# Patient Record
Sex: FEMALE | Race: WHITE | HISPANIC OR LATINO | Employment: PART TIME | URBAN - METROPOLITAN AREA
[De-identification: names, ages, dates, MRNs, and addresses within clinical notes are randomized per-mention and may not be internally consistent; named-entity substitution may affect disease eponyms.]

---

## 2023-09-21 ENCOUNTER — OFFICE VISIT (OUTPATIENT)
Dept: URGENT CARE | Facility: CLINIC | Age: 48
End: 2023-09-21
Payer: COMMERCIAL

## 2023-09-21 ENCOUNTER — APPOINTMENT (OUTPATIENT)
Dept: RADIOLOGY | Facility: CLINIC | Age: 48
End: 2023-09-21
Payer: COMMERCIAL

## 2023-09-21 VITALS
WEIGHT: 171.38 LBS | HEART RATE: 78 BPM | TEMPERATURE: 98 F | SYSTOLIC BLOOD PRESSURE: 128 MMHG | DIASTOLIC BLOOD PRESSURE: 78 MMHG | RESPIRATION RATE: 14 BRPM | OXYGEN SATURATION: 97 %

## 2023-09-21 DIAGNOSIS — S20.212A RIB CONTUSION, LEFT, INITIAL ENCOUNTER: ICD-10-CM

## 2023-09-21 DIAGNOSIS — S80.02XA CONTUSION OF LEFT KNEE, INITIAL ENCOUNTER: ICD-10-CM
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
## 2023-09-21 PROCEDURE — 99283 EMERGENCY DEPT VISIT LOW MDM: CPT | Performed by: PHYSICIAN ASSISTANT

## 2023-09-21 PROCEDURE — G0382 LEV 3 HOSP TYPE B ED VISIT: HCPCS | Performed by: PHYSICIAN ASSISTANT

## 2023-09-21 PROCEDURE — 73564 X-RAY EXAM KNEE 4 OR MORE: CPT

## 2023-09-21 PROCEDURE — 71101 X-RAY EXAM UNILAT RIBS/CHEST: CPT

## 2023-09-21 RX ORDER — IBUPROFEN 800 MG/1
800 TABLET ORAL EVERY 6 HOURS PRN
Qty: 30 TABLET | Refills: 0 | Status: SHIPPED | OUTPATIENT
Start: 2023-09-21

## 2023-09-21 RX ORDER — METHOCARBAMOL 500 MG/1
500 TABLET, FILM COATED ORAL 4 TIMES DAILY
Qty: 28 TABLET | Refills: 0 | Status: SHIPPED | OUTPATIENT
Start: 2023-09-21

## 2023-09-21 NOTE — LETTER
September 21, 2023     Patient: Brandon Fletcher   YOB: 1975   Date of Visit: 9/21/2023       To Whom it May Concern:    Brandon Fletcher was seen in my clinic on 9/21/2023. She is excused from work 09/21/2023 and may return 09/22/2023. If you have any questions or concerns, please don't hesitate to call.          Sincerely,          Jackie Santana PA-C        CC: No Recipients
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
## 2023-12-12 ENCOUNTER — OFFICE VISIT (OUTPATIENT)
Dept: URGENT CARE | Facility: CLINIC | Age: 48
End: 2023-12-12
Payer: COMMERCIAL

## 2023-12-12 VITALS
HEART RATE: 70 BPM | DIASTOLIC BLOOD PRESSURE: 80 MMHG | SYSTOLIC BLOOD PRESSURE: 118 MMHG | RESPIRATION RATE: 18 BRPM | OXYGEN SATURATION: 98 % | TEMPERATURE: 97.7 F

## 2023-12-12 DIAGNOSIS — J06.9 ACUTE URI: Primary | ICD-10-CM

## 2023-12-12 PROCEDURE — 87636 SARSCOV2 & INF A&B AMP PRB: CPT | Performed by: PHYSICIAN ASSISTANT

## 2023-12-12 PROCEDURE — G0383 LEV 4 HOSP TYPE B ED VISIT: HCPCS | Performed by: PHYSICIAN ASSISTANT

## 2023-12-12 PROCEDURE — S9083 URGENT CARE CENTER GLOBAL: HCPCS | Performed by: PHYSICIAN ASSISTANT

## 2023-12-12 RX ORDER — DEXTROMETHORPHAN HYDROBROMIDE AND PROMETHAZINE HYDROCHLORIDE 15; 6.25 MG/5ML; MG/5ML
5 SYRUP ORAL 4 TIMES DAILY PRN
Qty: 120 ML | Refills: 0 | Status: SHIPPED | OUTPATIENT
Start: 2023-12-12

## 2023-12-12 NOTE — PROGRESS NOTES
[facility]        NAME: Armida Rodriguez is a 53 y.o. female  : 1975    MRN: [id number]  DATE: 2023  TIME: 12:19 PM      Assessment and Plan     Acute URI [J06.9]  1. Acute URI  Covid/Flu-Office Collect    promethazine-dextromethorphan (PHENERGAN-DM) 6.25-15 mg/5 mL oral syrup        Note:   Covid/flu send out--patient just out of window for Tamiflu anyway, but wants to know because she is a . Gave cough med Rx as well. Patient Instructions   There are no Patient Instructions on file for this visit. Follow up with PCP in 3-5 days. Go to ER if symptoms worsen. Chief Complaint     Chief Complaint   Patient presents with    Cold Like Symptoms     Pt c/o cough with head and chest congestion, body aches that started on  am. Pt states at home covid test negative. History of Present Illness     Patient presents with cough and body aches x 2 days. She states she has been taking mucinex and aleve without relief. She took a rapid covid test at home yesterday which was negative. Review of Systems     Review of Systems   Constitutional:  Negative for chills, fatigue and fever. HENT:  Negative for congestion, ear pain, postnasal drip, rhinorrhea, sinus pressure, sinus pain and sore throat. Eyes:  Negative for pain and visual disturbance. Respiratory:  Positive for cough. Negative for chest tightness and shortness of breath. Cardiovascular:  Negative for chest pain and palpitations. Gastrointestinal:  Negative for abdominal pain, diarrhea, nausea and vomiting. Genitourinary:  Negative for dysuria and hematuria. Musculoskeletal:  Positive for myalgias. Negative for arthralgias and back pain. Skin:  Negative for rash. Neurological:  Negative for dizziness, seizures, syncope, numbness and headaches. All other systems reviewed and are negative.         Current Medications       Current Outpatient Medications: promethazine-dextromethorphan (PHENERGAN-DM) 6.25-15 mg/5 mL oral syrup, Take 5 mL by mouth 4 (four) times a day as needed for cough, Disp: 120 mL, Rfl: 0    ibuprofen (MOTRIN) 800 mg tablet, Take 1 tablet (800 mg total) by mouth every 6 (six) hours as needed for mild pain, Disp: 30 tablet, Rfl: 0    methocarbamol (ROBAXIN) 500 mg tablet, Take 1 tablet (500 mg total) by mouth 4 (four) times a day, Disp: 28 tablet, Rfl: 0    Current Allergies     Allergies as of 12/12/2023    (No Known Allergies)              The following portions of the patient's history were reviewed and updated as appropriate: allergies, current medications, past family history, past medical history, past social history, past surgical history, and problem list.     History reviewed. No pertinent past medical history. History reviewed. No pertinent surgical history. History reviewed. No pertinent family history. Medications have been verified. Objective     /80   Pulse 70   Temp 97.7 °F (36.5 °C) (Temporal)   Resp 18   SpO2 98%   No LMP recorded. Physical Exam     Physical Exam  Vitals and nursing note reviewed. Constitutional:       Appearance: Normal appearance. She is normal weight. HENT:      Head: Normocephalic and atraumatic. Nose: Nose normal.      Mouth/Throat:      Mouth: Mucous membranes are moist.      Pharynx: Oropharynx is clear. Cardiovascular:      Rate and Rhythm: Normal rate and regular rhythm. Heart sounds: Normal heart sounds. Pulmonary:      Effort: Pulmonary effort is normal.      Breath sounds: Normal breath sounds. Skin:     General: Skin is warm and dry. Neurological:      General: No focal deficit present. Mental Status: She is alert and oriented to person, place, and time.    Psychiatric:         Mood and Affect: Mood normal.         Behavior: Behavior normal.

## 2023-12-12 NOTE — LETTER
December 12, 2023     Patient: Luis A Weston   YOB: 1975   Date of Visit: 12/12/2023       To Whom it May Concern:    Luis A Weston was seen in my clinic on 12/12/2023. She may return to work on 12/13/2023 . If you have any questions or concerns, please don't hesitate to call.          Sincerely,          Arnaldo Mensah PA-C        CC: No Recipients

## 2023-12-13 LAB
FLUAV RNA RESP QL NAA+PROBE: NEGATIVE
FLUBV RNA RESP QL NAA+PROBE: NEGATIVE
SARS-COV-2 RNA RESP QL NAA+PROBE: NEGATIVE

## 2024-01-10 ENCOUNTER — APPOINTMENT (EMERGENCY)
Dept: RADIOLOGY | Facility: HOSPITAL | Age: 49
End: 2024-01-10
Payer: COMMERCIAL

## 2024-01-10 ENCOUNTER — HOSPITAL ENCOUNTER (EMERGENCY)
Facility: HOSPITAL | Age: 49
Discharge: HOME | End: 2024-01-10
Attending: EMERGENCY MEDICINE
Payer: COMMERCIAL

## 2024-01-10 VITALS
OXYGEN SATURATION: 100 % | SYSTOLIC BLOOD PRESSURE: 130 MMHG | RESPIRATION RATE: 18 BRPM | TEMPERATURE: 97.6 F | HEART RATE: 97 BPM | DIASTOLIC BLOOD PRESSURE: 63 MMHG

## 2024-01-10 DIAGNOSIS — S89.92XA LEFT KNEE INJURY, INITIAL ENCOUNTER: Primary | ICD-10-CM

## 2024-01-10 DIAGNOSIS — M25.562 ACUTE PAIN OF LEFT KNEE: ICD-10-CM

## 2024-01-10 PROCEDURE — 96372 THER/PROPH/DIAG INJ SC/IM: CPT

## 2024-01-10 PROCEDURE — 99284 EMERGENCY DEPT VISIT MOD MDM: CPT

## 2024-01-10 PROCEDURE — 73564 X-RAY EXAM KNEE 4 OR MORE: CPT

## 2024-01-10 PROCEDURE — 99284 EMERGENCY DEPT VISIT MOD MDM: CPT | Performed by: EMERGENCY MEDICINE

## 2024-01-10 RX ORDER — KETOROLAC TROMETHAMINE 30 MG/ML
30 INJECTION, SOLUTION INTRAMUSCULAR; INTRAVENOUS ONCE
Status: COMPLETED | OUTPATIENT
Start: 2024-01-10 | End: 2024-01-10

## 2024-01-10 RX ORDER — ACETAMINOPHEN 325 MG/1
975 TABLET ORAL ONCE
Status: COMPLETED | OUTPATIENT
Start: 2024-01-10 | End: 2024-01-10

## 2024-01-10 RX ORDER — METHOCARBAMOL 500 MG/1
1000 TABLET, FILM COATED ORAL 3 TIMES DAILY PRN
Qty: 30 TABLET | Refills: 0 | Status: SHIPPED | OUTPATIENT
Start: 2024-01-10

## 2024-01-10 RX ADMIN — KETOROLAC TROMETHAMINE 30 MG: 30 INJECTION, SOLUTION INTRAMUSCULAR at 18:10

## 2024-01-10 RX ADMIN — ACETAMINOPHEN 975 MG: 325 TABLET, FILM COATED ORAL at 18:10

## 2024-01-10 NOTE — Clinical Note
Aliza Parker was seen and treated in our emergency department on 1/10/2024.                Diagnosis: knee pain, difficulty walking    Aliza  may return to work on return date.    She may return on this date: 01/15/2024         If you have any questions or concerns, please don't hesitate to call.      Maximino Kessler MD    ______________________________           _______________          _______________  Hospital Representative                              Date                                Time

## 2024-01-10 NOTE — DISCHARGE INSTRUCTIONS
Please follow up PCP.  If taking the muscle relaxant Robaxin, please do not drive or operate heavy machinery or perform other dangerous tasks.  Recommend tylenol 650 mg and ibuprofen 600 mg every 6 hours as needed for pain. Please return for severe chest pain, significant shortness of breath, severely worsening symptoms, or any other concerning signs or symptoms. Please refer to the following documents for additional instructions and return precautions.

## 2024-01-10 NOTE — ED PROVIDER NOTES
History  Chief Complaint   Patient presents with    Fall     L knee pain after a fall yesterday      48-year-old female no significant past history presenting with left knee pain.  Patient reports mechanical slip and fall on ice yesterday landing on her left knee.  Patient reports able to weight-bear by pain with weightbearing.  Denies any break in skin.  Denies any neurological changes such as motor or sensory deficits.  Denies any other pain or injury.  Denies any other complaints.  Chart reviewed.    No past medical history on file.  Family History: non-contributory  Social History          Prior to Admission Medications   Prescriptions Last Dose Informant Patient Reported? Taking?   ibuprofen (MOTRIN) 800 mg tablet   No No   Sig: Take 1 tablet (800 mg total) by mouth every 6 (six) hours as needed for mild pain   methocarbamol (ROBAXIN) 500 mg tablet   No No   Sig: Take 1 tablet (500 mg total) by mouth 4 (four) times a day   promethazine-dextromethorphan (PHENERGAN-DM) 6.25-15 mg/5 mL oral syrup   No No   Sig: Take 5 mL by mouth 4 (four) times a day as needed for cough      Facility-Administered Medications: None       No past medical history on file.    No past surgical history on file.    No family history on file.  I have reviewed and agree with the history as documented.    E-Cigarette/Vaping    E-Cigarette Use Never User      E-Cigarette/Vaping Substances     Social History     Tobacco Use    Smoking status: Every Day     Types: Cigarettes     Passive exposure: Current    Smokeless tobacco: Never   Vaping Use    Vaping status: Never Used   Substance Use Topics    Alcohol use: Not Currently    Drug use: Not Currently       Review of Systems   Constitutional:  Negative for appetite change, chills, diaphoresis, fever and unexpected weight change.   HENT:  Negative for congestion and rhinorrhea.    Eyes:  Negative for photophobia and visual disturbance.   Respiratory:  Negative for cough, chest tightness and  shortness of breath.    Cardiovascular:  Negative for chest pain, palpitations and leg swelling.   Gastrointestinal:  Negative for abdominal distention, abdominal pain, blood in stool, constipation, diarrhea, nausea and vomiting.   Genitourinary:  Negative for dysuria and hematuria.   Musculoskeletal:  Positive for arthralgias. Negative for back pain, joint swelling, neck pain and neck stiffness.   Skin:  Negative for color change, pallor, rash and wound.   Neurological:  Negative for dizziness, syncope, weakness, light-headedness and headaches.   Psychiatric/Behavioral:  Negative for agitation.    All other systems reviewed and are negative.      Physical Exam  Physical Exam  Vitals and nursing note reviewed.   Constitutional:       General: She is not in acute distress.     Appearance: Normal appearance. She is well-developed. She is not ill-appearing, toxic-appearing or diaphoretic.   HENT:      Head: Normocephalic and atraumatic.      Nose: Nose normal. No congestion or rhinorrhea.      Mouth/Throat:      Mouth: Mucous membranes are moist.      Pharynx: Oropharynx is clear. No oropharyngeal exudate or posterior oropharyngeal erythema.   Eyes:      General: No scleral icterus.        Right eye: No discharge.         Left eye: No discharge.      Extraocular Movements: Extraocular movements intact.      Conjunctiva/sclera: Conjunctivae normal.      Pupils: Pupils are equal, round, and reactive to light.   Neck:      Vascular: No JVD.      Trachea: No tracheal deviation.      Comments: Supple. Normal range of motion.   Cardiovascular:      Rate and Rhythm: Normal rate and regular rhythm.      Heart sounds: Normal heart sounds. No murmur heard.     No friction rub. No gallop.      Comments: Normal rate and regular rhythm  Pulmonary:      Effort: Pulmonary effort is normal. No respiratory distress.      Breath sounds: Normal breath sounds. No stridor. No wheezing or rales.      Comments: Clear to auscultation  bilaterally  Chest:      Chest wall: No tenderness.   Abdominal:      General: Bowel sounds are normal. There is no distension.      Palpations: Abdomen is soft.      Tenderness: There is no abdominal tenderness. There is no right CVA tenderness, left CVA tenderness, guarding or rebound.      Comments: Soft, nontender, nondistended.  Normal bowel sounds throughout   Musculoskeletal:         General: Tenderness present. No swelling, deformity or signs of injury. Normal range of motion.      Cervical back: Normal range of motion and neck supple. No rigidity. No muscular tenderness.      Right lower leg: No edema.      Left lower leg: No edema.      Comments: Tenderness medial aspect of the left knee.  Full range of motion intact.  2+ DP PT pulses   Lymphadenopathy:      Cervical: No cervical adenopathy.   Skin:     General: Skin is warm and dry.      Coloration: Skin is not pale.      Findings: No erythema or rash.   Neurological:      General: No focal deficit present.      Mental Status: She is alert. Mental status is at baseline.      Sensory: No sensory deficit.      Motor: No weakness or abnormal muscle tone.      Coordination: Coordination normal.      Comments: Alert.  Strength and sensation grossly intact.  Some difficulty with ambulation secondary to left knee pain   Psychiatric:         Behavior: Behavior normal.         Thought Content: Thought content normal.         Vital Signs  ED Triage Vitals   Temperature Pulse Respirations Blood Pressure SpO2   01/10/24 1702 01/10/24 1702 01/10/24 1702 01/10/24 1702 01/10/24 1702   97.6 °F (36.4 °C) 97 18 130/63 100 %      Temp src Heart Rate Source Patient Position - Orthostatic VS BP Location FiO2 (%)   -- -- -- -- --             Pain Score       01/10/24 1810       8           Vitals:    01/10/24 1702   BP: 130/63   Pulse: 97         Visual Acuity  Visual Acuity      Flowsheet Row Most Recent Value   L Pupil Size (mm) 3   R Pupil Size (mm) 3            ED  Medications  Medications   acetaminophen (TYLENOL) tablet 975 mg (975 mg Oral Given 1/10/24 1810)   ketorolac (TORADOL) injection 30 mg (30 mg Intramuscular Given 1/10/24 1810)       Diagnostic Studies  Results Reviewed       None                   XR knee 4+ vw left injury    (Results Pending)              Procedures  Procedures         ED Course                               SBIRT 20yo+      Flowsheet Row Most Recent Value   Initial Alcohol Screen: US AUDIT-C     1. How often do you have a drink containing alcohol? 0 Filed at: 01/10/2024 1848   2. How many drinks containing alcohol do you have on a typical day you are drinking?  0 Filed at: 01/10/2024 1848   3b. FEMALE Any Age, or MALE 65+: How often do you have 4 or more drinks on one occassion? 0 Filed at: 01/10/2024 1848   Audit-C Score 0 Filed at: 01/10/2024 1848   OPHELIA: How many times in the past year have you...    Used an illegal drug or used a prescription medication for non-medical reasons? Never Filed at: 01/10/2024 1848                      Medical Decision Making  48-year-old female no significant past history presenting with left knee pain.  Mechanical slip and fall landing on left knee with pain and tenderness.  Full range of motion intact.  Neurovascular intact.  Plan for x-rays.  Symptom management with oral and IM medications.  Reassess.    X-rays interpreted by me without significant acute process.  Symptoms improving with medications.  Able to ambulate using crutches.  Work note. Discussed results and recommendations. Advised follow up PCP and ortho. Medication recommendations. Given instructions and return precautions. Patient/family at bedside acknowledged understanding of all written and verbal instructions and return precautions. Discharged.     Amount and/or Complexity of Data Reviewed  Radiology: ordered.    Risk  OTC drugs.  Prescription drug management.             Disposition  Final diagnoses:   Left knee injury, initial encounter    Acute pain of left knee     Time reflects when diagnosis was documented in both MDM as applicable and the Disposition within this note       Time User Action Codes Description Comment    1/10/2024  5:52 PM Maximino Kessler Add [S89.92XA] Left knee injury, initial encounter     1/10/2024  5:52 PM Maximino Kessler Add [M25.562] Acute pain of left knee           ED Disposition       ED Disposition   Discharge    Condition   Stable    Date/Time   Wed Calvin 10, 2024 1752    Comment   Aliza Parker discharge to home/self care.                   Follow-up Information       Follow up With Specialties Details Why Contact Info Additional Information    Infolink  Call  for [phone]       [facility] Schedule an appointment as soon as possible for a visit in 1 week  [address]  [facility] [address] [phone] [facility] [address], [place], [place], [address] [phone]            Discharge Medication List as of 1/10/2024  5:53 PM        START taking these medications    Details   !! methocarbamol (ROBAXIN) 500 mg tablet Take 2 tablets (1,000 mg total) by mouth 3 (three) times a day as needed for muscle spasms (knee pain), Starting Wed 1/10/2024, Normal       !! - Potential duplicate medications found. Please discuss with provider.        CONTINUE these medications which have NOT CHANGED    Details   ibuprofen (MOTRIN) 800 mg tablet Take 1 tablet (800 mg total) by mouth every 6 (six) hours as needed for mild pain, Starting Thu 9/21/2023, Normal      !! methocarbamol (ROBAXIN) 500 mg tablet Take 1 tablet (500 mg total) by mouth 4 (four) times a day, Starting Thu 9/21/2023, Normal      promethazine-dextromethorphan (PHENERGAN-DM) 6.25-15 mg/5 mL oral syrup Take 5 mL by mouth 4 (four) times a day as needed for cough, Starting Tue 12/12/2023, Normal       !! - Potential  duplicate medications found. Please discuss with provider.          No discharge procedures on file.    PDMP Review       None            ED Provider  Electronically Signed by             Maximino Kessler MD  01/11/24 0002

## 2024-12-28 ENCOUNTER — HOSPITAL ENCOUNTER (EMERGENCY)
Facility: HOSPITAL | Age: 49
Discharge: HOME | End: 2024-12-28
Attending: EMERGENCY MEDICINE
Payer: COMMERCIAL

## 2024-12-28 VITALS
OXYGEN SATURATION: 98 % | DIASTOLIC BLOOD PRESSURE: 72 MMHG | SYSTOLIC BLOOD PRESSURE: 134 MMHG | TEMPERATURE: 98.4 F | HEART RATE: 103 BPM | RESPIRATION RATE: 20 BRPM

## 2024-12-28 DIAGNOSIS — R05.9 COUGH: Primary | ICD-10-CM

## 2024-12-28 LAB
FLUAV AG UPPER RESP QL IA.RAPID: NEGATIVE
FLUBV AG UPPER RESP QL IA.RAPID: NEGATIVE
SARS-COV+SARS-COV-2 AG RESP QL IA.RAPID: NEGATIVE

## 2024-12-28 PROCEDURE — 99283 EMERGENCY DEPT VISIT LOW MDM: CPT

## 2024-12-28 PROCEDURE — 99284 EMERGENCY DEPT VISIT MOD MDM: CPT | Performed by: EMERGENCY MEDICINE

## 2024-12-28 PROCEDURE — 87811 SARS-COV-2 COVID19 W/OPTIC: CPT | Performed by: EMERGENCY MEDICINE

## 2024-12-28 PROCEDURE — 87804 INFLUENZA ASSAY W/OPTIC: CPT | Performed by: EMERGENCY MEDICINE

## 2024-12-28 RX ORDER — CETIRIZINE HYDROCHLORIDE 10 MG/1
10 TABLET ORAL DAILY
Qty: 30 TABLET | Refills: 0 | Status: SHIPPED | OUTPATIENT
Start: 2024-12-28

## 2024-12-28 RX ORDER — CETIRIZINE HYDROCHLORIDE 10 MG/1
10 TABLET ORAL DAILY
Qty: 30 TABLET | Refills: 0 | Status: SHIPPED | OUTPATIENT
Start: 2024-12-28 | End: 2024-12-28

## 2025-01-06 NOTE — ED PROVIDER NOTES
Time reflects when diagnosis was documented in both MDM as applicable and the Disposition within this note       Time User Action Codes Description Comment    12/28/2024  4:16 PM Jordan Michael Add [R05.9] Cough           ED Disposition       ED Disposition   Discharge    Condition   Stable    Date/Time   Sat Dec 28, 2024  4:16 PM    Comment   Aliza Parker discharge to home/self care.                   Assessment & Plan       Medical Decision Making  49-year-old female with viral syndrome, recommend supportive care, Tylenol Motrin as needed, antihistamine for congestion, reviewed return precautions.    Amount and/or Complexity of Data Reviewed  Labs: ordered.    Risk  OTC drugs.             Medications - No data to display    ED Risk Strat Scores                          SBIRT 22yo+      Flowsheet Row Most Recent Value   Initial Alcohol Screen: US AUDIT-C     1. How often do you have a drink containing alcohol? 0 Filed at: 12/28/2024 1514   2. How many drinks containing alcohol do you have on a typical day you are drinking?  0 Filed at: 12/28/2024 1514   3a. Male UNDER 65: How often do you have five or more drinks on one occasion? 0 Filed at: 12/28/2024 1514   3b. FEMALE Any Age, or MALE 65+: How often do you have 4 or more drinks on one occassion? 0 Filed at: 12/28/2024 1514   Audit-C Score 0 Filed at: 12/28/2024 1514   OPHELIA: How many times in the past year have you...    Used an illegal drug or used a prescription medication for non-medical reasons? Never Filed at: 12/28/2024 1514                            History of Present Illness       Chief Complaint   Patient presents with    Cough     Cough, runny nose ear ache x 1.5 months, family here and sick w same        No past medical history on file.   No past surgical history on file.   No family history on file.   Social History     Tobacco Use    Smoking status: Every Day     Types: Cigarettes     Passive exposure: Current    Smokeless tobacco: Never   Vaping Use     Vaping status: Never Used   Substance Use Topics    Alcohol use: Not Currently    Drug use: Not Currently      E-Cigarette/Vaping    E-Cigarette Use Never User       E-Cigarette/Vaping Substances      I have reviewed and agree with the history as documented.     49-year-old female presenting to the emergency department for evaluation of cough.  Patient had cough congestion runny nose for several days, adult daughter is here with same.  She has no shortness of breath she has no fevers or chills.        Review of Systems   Constitutional:  Negative for appetite change, chills, fatigue and fever.   HENT:  Positive for congestion and sore throat. Negative for sneezing.    Eyes:  Negative for visual disturbance.   Respiratory:  Positive for cough. Negative for choking, chest tightness, shortness of breath and wheezing.    Cardiovascular:  Negative for chest pain and palpitations.   Gastrointestinal:  Negative for abdominal pain, constipation, diarrhea, nausea and vomiting.   Genitourinary:  Negative for difficulty urinating and dysuria.   Neurological:  Negative for dizziness, weakness, light-headedness, numbness and headaches.   All other systems reviewed and are negative.          Objective       ED Triage Vitals [12/28/24 1513]   Temperature Pulse Blood Pressure Respirations SpO2 Patient Position - Orthostatic VS   98.4 °F (36.9 °C) 103 134/72 20 98 % Sitting      Temp Source Heart Rate Source BP Location FiO2 (%) Pain Score    Oral Monitor Left arm -- --      Vitals      Date and Time Temp Pulse SpO2 Resp BP Pain Score FACES Pain Rating User   12/28/24 1513 98.4 °F (36.9 °C) 103 98 % 20 134/72 -- -- AS            Physical Exam  Constitutional:       General: She is not in acute distress.     Appearance: She is well-developed. She is not diaphoretic.   HENT:      Head: Normocephalic and atraumatic.   Eyes:      Pupils: Pupils are equal, round, and reactive to light.   Neck:      Vascular: No JVD.      Trachea: No  tracheal deviation.   Cardiovascular:      Rate and Rhythm: Normal rate and regular rhythm.      Heart sounds: Normal heart sounds. No murmur heard.     No friction rub. No gallop.   Pulmonary:      Effort: Pulmonary effort is normal. No respiratory distress.      Breath sounds: Normal breath sounds. No wheezing or rales.   Abdominal:      General: Bowel sounds are normal. There is no distension.      Palpations: Abdomen is soft.      Tenderness: There is no abdominal tenderness. There is no guarding or rebound.   Skin:     General: Skin is warm and dry.      Coloration: Skin is not pale.   Neurological:      Mental Status: She is alert and oriented to person, place, and time.      Cranial Nerves: No cranial nerve deficit.      Motor: No abnormal muscle tone.   Psychiatric:         Behavior: Behavior normal.         Results Reviewed       Procedure Component Value Units Date/Time    FLU/COVID Rapid Antigen (30 min. TAT) - Preferred screening test in ED [[id number]]  (Normal) Collected: 12/28/24 1515    Lab Status: Final result Specimen: Nares from Nose Updated: 12/28/24 1540     SARS COV Rapid Antigen Negative     Influenza A Rapid Antigen Negative     Influenza B Rapid Antigen Negative    Narrative:      This test has been performed using the [employer] 2 FLU+SARS Antigen test under the Emergency Use Authorization (EUA). This test has been validated by the  and verified by the performing laboratory. The [employer] uses lateral flow immunofluorescent sandwich assay to detect SARS-COV, Influenza A and Influenza B Antigen.     The [employer] 2 SARS Antigen test does not differentiate between SARS-CoV and SARS-CoV-2.     Negative results are presumptive and may be confirmed with a molecular assay, if necessary, for patient management. Negative results do not rule out SARS-CoV-2 or influenza infection and should not be used as the sole basis for treatment or patient management decisions. A negative test result  may occur if the level of antigen in a sample is below the limit of detection of this test.     Positive results are indicative of the presence of viral antigens, but do not rule out bacterial infection or co-infection with other viruses.     All test results should be used as an adjunct to clinical observations and other information available to the provider.    FOR PEDIATRIC PATIENTS - copy/paste COVID Guidelines URL to browser: [email or web address]            No orders to display       Procedures    ED Medication and Procedure Management   Prior to Admission Medications   Prescriptions Last Dose Informant Patient Reported? Taking?   ibuprofen (MOTRIN) 800 mg tablet   No No   Sig: Take 1 tablet (800 mg total) by mouth every 6 (six) hours as needed for mild pain   methocarbamol (ROBAXIN) 500 mg tablet   No No   Sig: Take 1 tablet (500 mg total) by mouth 4 (four) times a day   methocarbamol (ROBAXIN) 500 mg tablet   No No   Sig: Take 2 tablets (1,000 mg total) by mouth 3 (three) times a day as needed for muscle spasms (knee pain)   promethazine-dextromethorphan (PHENERGAN-DM) 6.25-15 mg/5 mL oral syrup   No No   Sig: Take 5 mL by mouth 4 (four) times a day as needed for cough      Facility-Administered Medications: None     Discharge Medication List as of 12/28/2024  4:18 PM        START taking these medications    Details   cetirizine (ZyrTEC) 10 mg tablet Take 1 tablet (10 mg total) by mouth daily, Starting Sat 12/28/2024, Normal           CONTINUE these medications which have NOT CHANGED    Details   ibuprofen (MOTRIN) 800 mg tablet Take 1 tablet (800 mg total) by mouth every 6 (six) hours as needed for mild pain, Starting Thu 9/21/2023, Normal      !! methocarbamol (ROBAXIN) 500 mg tablet Take 1 tablet (500 mg total) by mouth 4 (four) times a day, Starting u 9/21/2023, Normal      !! methocarbamol (ROBAXIN) 500 mg tablet Take 2 tablets (1,000 mg total) by  mouth 3 (three) times a day as needed for muscle spasms (knee pain), Starting Wed 1/10/2024, Normal      promethazine-dextromethorphan (PHENERGAN-DM) 6.25-15 mg/5 mL oral syrup Take 5 mL by mouth 4 (four) times a day as needed for cough, Starting Tue 12/12/2023, Normal       !! - Potential duplicate medications found. Please discuss with provider.        No discharge procedures on file.  ED SEPSIS DOCUMENTATION   Time reflects when diagnosis was documented in both MDM as applicable and the Disposition within this note       Time User Action Codes Description Comment    12/28/2024  4:16 PM Jordan Michael Add [R05.9] Cornelio Michael MD  01/06/25 0051